# Patient Record
Sex: FEMALE | Race: WHITE | Employment: UNEMPLOYED | URBAN - METROPOLITAN AREA
[De-identification: names, ages, dates, MRNs, and addresses within clinical notes are randomized per-mention and may not be internally consistent; named-entity substitution may affect disease eponyms.]

---

## 2018-09-05 ENCOUNTER — OFFICE VISIT (OUTPATIENT)
Dept: OBGYN CLINIC | Age: 53
End: 2018-09-05

## 2018-09-05 VITALS
OXYGEN SATURATION: 100 % | SYSTOLIC BLOOD PRESSURE: 147 MMHG | DIASTOLIC BLOOD PRESSURE: 87 MMHG | BODY MASS INDEX: 34.66 KG/M2 | HEIGHT: 64 IN | HEART RATE: 75 BPM | RESPIRATION RATE: 18 BRPM | WEIGHT: 203 LBS

## 2018-09-05 DIAGNOSIS — Z01.419 ENCOUNTER FOR WELL WOMAN EXAM WITH ROUTINE GYNECOLOGICAL EXAM: Primary | ICD-10-CM

## 2018-09-05 RX ORDER — THERA TABS 400 MCG
1 TAB ORAL DAILY
COMMUNITY
End: 2018-09-17

## 2018-09-05 NOTE — PROGRESS NOTES
Subjective:   47 y.o. female for Well Woman Check. No LMP recorded. Social History: not sexually active. Pertinent past medical hstory: current smoker, history of TIA, no history of HTN, DVT, CAD, DM, liver disease, or migraines. Current Outpatient Prescriptions   Medication Sig Dispense Refill    therapeutic multivitamin (THERAGRAN) tablet Take 1 Tab by mouth daily.  oxybutynin chloride XL (DITROPAN XL) 10 mg CR tablet Take 1 Tab by mouth two (2) times a day. 60 Tab 5    pravastatin (PRAVACHOL) 20 mg tablet Take 20 mg by mouth nightly.  naproxen (NAPROSYN) 500 mg tablet Take 500 mg by mouth two (2) times daily (with meals).  FLUoxetine (PROZAC) 20 mg capsule Take  by mouth daily.  HYDROcodone-acetaminophen (NORCO) 5-325 mg per tablet Take 1 Tab by mouth every four (4) hours as needed for Pain.  30 Tab 0     Allergies   Allergen Reactions    Demerol [Meperidine] Hives, Nausea and Vomiting and Vertigo     Past Medical History:   Diagnosis Date    ADHD (attention deficit hyperactivity disorder)     Anxiety     Arthritis     Depression     Heart attack ([facility] [address] 2016    Hypercholesterolemia     Liver disease     Post traumatic stress disorder     Psychiatric diagnosis     Scoliosis     Seizure disorder ([facility] [address]     Sickle cell disease ([facility] [address]     Urinary tract infection      Past Surgical History:   Procedure Laterality Date    HX COLONOSCOPY  4/23/2013    HX TUBAL LIGATION  1993     Family History   Problem Relation Age of Onset    Diabetes Mother     Hypertension Mother     Lung Disease Mother     Asthma Mother     Breast Cancer Mother     Cancer Father      liver,prostate    Heart Disease Father     Diabetes Father     Parkinsonism Father     Breast Cancer Maternal Aunt     Breast Cancer Maternal Grandmother      Social History   Substance Use Topics    Smoking status: Current Some Day Smoker     Packs/day: 1.00     Years: 39.00    Smokeless tobacco: Never Used    Alcohol use No        ROS:  Feeling well. No dyspnea or chest pain on exertion. No abdominal pain, change in bowel habits, black or bloody stools. No urinary tract symptoms. GYN ROS: no breast pain or new or enlarging lumps on self exam, no vaginal bleeding, no discharge or pelvic pain. No neurological complaints. Objective:     Visit Vitals    /87    Pulse 75    Resp 18    Ht 5' 4\" (1.626 m)    Wt 203 lb (92.1 kg)    SpO2 100%    BMI 34.84 kg/m2     The patient appears well, alert, oriented x 3, in no distress. ENT normal.  Neck supple. No adenopathy or thyromegaly. LAYTON. Lungs are clear, good air entry, no wheezes, rhonchi or rales. S1 and S2 normal, no murmurs, regular rate and rhythm. Abdomen soft without tenderness, guarding, mass or organomegaly. Extremities show no edema, normal peripheral pulses. Neurological is normal, no focal findings.     BREAST EXAM: breasts appear normal, no suspicious masses, no skin or nipple changes or axillary nodes    PELVIC EXAM: normal external genitalia, vulva, vagina, cervix, uterus and adnexa, exam limited by obesity, PAP: Pap smear done today, HPV test    Assessment/Plan:   well woman  mammogram  pap smear  return annually or prn

## 2018-09-08 LAB
CYTOLOGIST CVX/VAG CYTO: NORMAL
CYTOLOGY CVX/VAG DOC THIN PREP: NORMAL
DX ICD CODE: NORMAL
HPV I/H RISK 4 DNA CVX QL PROBE+SIG AMP: NEGATIVE
Lab: NORMAL
OTHER STN SPEC: NORMAL
PATH REPORT.FINAL DX SPEC: NORMAL
STAT OF ADQ CVX/VAG CYTO-IMP: NORMAL

## 2018-09-17 PROBLEM — R35.0 URINARY FREQUENCY: Status: ACTIVE | Noted: 2018-09-17

## 2018-09-18 ENCOUNTER — HOSPITAL ENCOUNTER (OUTPATIENT)
Dept: MAMMOGRAPHY | Age: 53
Discharge: HOME | End: 2018-09-18
Attending: OBSTETRICS & GYNECOLOGY
Payer: MEDICAID

## 2018-09-18 DIAGNOSIS — Z01.419 ENCOUNTER FOR WELL WOMAN EXAM WITH ROUTINE GYNECOLOGICAL EXAM: ICD-10-CM

## 2018-09-18 PROCEDURE — 77067 SCR MAMMO BI INCL CAD: CPT

## 2018-10-01 ENCOUNTER — HOSPITAL ENCOUNTER (OUTPATIENT)
Dept: ULTRASOUND IMAGING | Age: 53
Discharge: HOME | End: 2018-10-01
Attending: OBSTETRICS & GYNECOLOGY
Payer: MEDICAID

## 2018-10-01 ENCOUNTER — HOSPITAL ENCOUNTER (OUTPATIENT)
Dept: MAMMOGRAPHY | Age: 53
Discharge: HOME | End: 2018-10-01
Attending: OBSTETRICS & GYNECOLOGY
Payer: MEDICAID

## 2018-10-01 DIAGNOSIS — R92.8 ABNORMALITY OF BOTH BREASTS ON SCREENING MAMMOGRAM: ICD-10-CM

## 2018-10-01 PROCEDURE — 77062 BREAST TOMOSYNTHESIS BI: CPT

## 2020-02-03 ENCOUNTER — HOSPITAL ENCOUNTER (OUTPATIENT)
Dept: CT IMAGING | Age: 55
Discharge: HOME | End: 2020-02-03
Attending: INTERNAL MEDICINE
Payer: MEDICAID

## 2020-02-03 DIAGNOSIS — R91.1 SOLITARY PULMONARY NODULE: ICD-10-CM

## 2020-02-03 PROCEDURE — 71250 CT THORAX DX C-: CPT

## 2020-03-26 PROBLEM — R07.89 CHEST PRESSURE: Status: ACTIVE | Noted: 2019-09-03

## 2020-06-05 ENCOUNTER — HOSPITAL ENCOUNTER (OUTPATIENT)
Dept: MAMMOGRAPHY | Age: 55
Discharge: HOME | End: 2020-06-05
Attending: OBSTETRICS & GYNECOLOGY
Payer: MEDICAID

## 2020-06-05 DIAGNOSIS — Z12.31 VISIT FOR SCREENING MAMMOGRAM: ICD-10-CM

## 2020-06-05 PROCEDURE — 77063 BREAST TOMOSYNTHESIS BI: CPT

## 2020-07-10 ENCOUNTER — OFFICE VISIT (OUTPATIENT)
Dept: OBGYN CLINIC | Age: 55
End: 2020-07-10

## 2020-07-10 VITALS
WEIGHT: 187 LBS | HEART RATE: 108 BPM | HEIGHT: 64 IN | TEMPERATURE: 97.6 F | SYSTOLIC BLOOD PRESSURE: 112 MMHG | DIASTOLIC BLOOD PRESSURE: 89 MMHG | BODY MASS INDEX: 31.92 KG/M2

## 2020-07-10 DIAGNOSIS — N95.0 POSTMENOPAUSAL BLEEDING: ICD-10-CM

## 2020-07-10 DIAGNOSIS — Z01.419 WELL WOMAN EXAM WITH ROUTINE GYNECOLOGICAL EXAM: Primary | ICD-10-CM

## 2020-07-10 NOTE — PROGRESS NOTES
Subjective:   53 y.o. female for Well Woman Check. She reports intermittent bleeding x 1 year. She denies having told a physician prior to today. Though she reports no current bleeding, she does report passing blood clots when she did have an episode of bleeding. Additionally, she reports multiple family members having breast cancer and ovarian cancer, but denies having been told about BRCA testing. Lastly, she reports the need for continued management of urinary incontinence. No LMP recorded. (Menstrual status: Menopause). Patient Active Problem List   Diagnosis Code    Urinary frequency R35.0    Chest pressure R07.89    Epileptic seizures ([facility] [address] G40.909    Pyelonephritis N12     Patient Active Problem List    Diagnosis Date Noted    Chest pressure 09/03/2019    Urinary frequency 09/17/2018    Epileptic seizures ([facility] [address] 07/06/2015    Pyelonephritis 07/02/2011     Current Outpatient Medications   Medication Sig Dispense Refill    metFORMIN (GLUCOPHAGE) 500 mg tablet       atorvastatin (LIPITOR) 40 mg tablet       Aimovig Autoinjector 70 mg/mL injection       ProAir HFA 90 mcg/actuation inhaler INHALE 2 PUFFS BY MOUTH EVERY 6 HOURS AS NEEDED FOR 30 DAYS      propranoloL (INDERAL) 40 mg tablet       Lantus Solostar U-100 Insulin 100 unit/mL (3 mL) inpn       Lantus U-100 Insulin 100 unit/mL injection       HumaLOG U-100 Insulin 100 unit/mL injection       Gavilyte-C 240-22.72-6.72 -5.84 gram solution TAKE AS DIRECTED BY MOUTH      aspirin 81 mg chewable tablet       clopidogreL (PLAVIX) 75 mg tab       spironolactone (ALDACTONE) 25 mg tablet       pantoprazole (PROTONIX) 40 mg tablet       multivitamin (ONE A DAY) tablet Take 1 Tab by mouth daily.  CAT'S CLAW, UNCARIA TOMENTOSA, PO Take  by mouth.  oxybutynin chloride XL (DITROPAN XL) 10 mg CR tablet Take 1 Tab by mouth two (2) times a day.  180 Tab 3     Allergies   Allergen Reactions    Demerol [Meperidine] Hives, Nausea and Vomiting and Vertigo    Ketorolac Nausea and Vomiting and Hives    Tramadol Not Reported This Time     Past Medical History:   Diagnosis Date    ADHD (attention deficit hyperactivity disorder)     Anxiety     Arthritis     Depression     Heart attack ([facility] [address] 2016    Hypercholesterolemia     Kidney stones     Liver disease     Post traumatic stress disorder     Psychiatric diagnosis     Scoliosis     Seizure disorder ([facility] [address]     Sickle cell disease ([facility]     Urinary tract infection      Past Surgical History:   Procedure Laterality Date    HX COLONOSCOPY  4/23/2013    HX TRACHEOSTOMY      HX TUBAL LIGATION  1993     Family History   Problem Relation Age of Onset    Diabetes Mother     Hypertension Mother     Lung Disease Mother     Asthma Mother     Breast Cancer Mother     Cancer Mother     Cancer Father         liver,prostate    Heart Disease Father     Diabetes Father     Parkinsonism Father     Breast Cancer Maternal Aunt     Breast Cancer Maternal Grandmother      Social History     Tobacco Use    Smoking status: Current Every Day Smoker     Packs/day: 0.25     Years: 39.00     Pack years: 9.75    Smokeless tobacco: Never Used   Substance Use Topics    Alcohol use: No        ROS:  Feeling well. No dyspnea or chest pain on exertion. No abdominal pain, change in bowel habits, black or bloody stools. No urinary tract symptoms. GYN ROS: no breast pain or new or enlarging lumps on self exam, she complains of postmenopausal bleeding. No neurological complaints. Objective:     Visit Vitals  /89   Pulse (!) 108   Temp 97.6 °F (36.4 °C)   Ht 5' 4\" (1.626 m)   Wt 187 lb (84.8 kg)   BMI 32.10 kg/m²     The patient appears well, alert, oriented x 3, in no distress. ENT normal.  Neck supple. No adenopathy or thyromegaly. L[place]TON. Lungs are clear, good air entry, no wheezes, rhonchi or rales. S1 and S2 normal, no murmurs, regular rate and rhythm.  Abdomen soft without tenderness, guarding, mass or organomegaly. Extremities show no edema, normal peripheral pulses. Neurological is normal, no focal findings. BREAST EXAM: breasts appear normal, no suspicious masses, no skin or nipple changes or axillary nodes    PELVIC EXAM: normal external genitalia, vulva, vagina, cervix, uterus and adnexa    Assessment/Plan:   well woman  pap smear  counseled on breast self exam  return annually or prn  Discussed my concerns regarding her postmenopausal bleeding and family history of breast and ovarian cancer. I advised that she and her family need BRCA testing and she needs an immediate work-up for postmenopausal bleeding. I discussed our office closure and advised her that I do not want to begin her work-up and have to end it abruptly due to office closure. We will help arrange an urgent referral to [facility] for both work-ups. Ms. Pauline Parson expressed understanding. ICD-10-CM ICD-9-CM    1. Well woman exam with routine gynecological exam  Z01.419 V72.31 PAP IG, CT-NG TV HPV 16&18,45 ([id number], [id number])   2. Postmenopausal bleeding  N95.0 627.1 REFERRAL TO GYNECOLOGY     Encounter Diagnoses   Name Primary?  Well woman exam with routine gynecological exam Yes    Postmenopausal bleeding      Orders Placed This Encounter    REFERRAL TO GYNECOLOGY    PAP IG, CT-NG TV HPV 16&18,45 ([id number], [id number])       .

## 2020-07-10 NOTE — PROGRESS NOTES
Chief Complaint   Patient presents with    Well Woman     Visit Vitals  /89   Pulse (!) 108   Temp 97.6 °F (36.4 °C)   Ht 5' 4\" (1.626 m)   Wt 187 lb (84.8 kg)   BMI 32.10 kg/m²     Results for orders placed or performed in visit on 09/17/18   AMB POC URINALYSIS DIP STICK AUTO W/O MICRO   Result Value Ref Range    Color (UA POC) Yellow     Clarity (UA POC) Clear     Glucose (UA POC) Negative Negative    Bilirubin (UA POC) Negative Negative    Ketones (UA POC) Negative Negative    Specific gravity (UA POC) 1.025 1.001 - 1.035    Blood (UA POC) Negative Negative    pH (UA POC) 6 4.6 - 8.0    Protein (UA POC) Negative Negative    Urobilinogen (UA POC) 0.2 mg/dL 0.2 - 1    Nitrites (UA POC) Negative Negative    Leukocyte esterase (UA POC) Negative Negative

## 2020-07-23 LAB
C TRACH RRNA CVX QL NAA+PROBE: NEGATIVE
CYTOLOGIST CVX/VAG CYTO: NORMAL
CYTOLOGY CVX/VAG DOC CYTO: NORMAL
CYTOLOGY CVX/VAG DOC THIN PREP: NORMAL
DX ICD CODE: NORMAL
HPV I/H RISK 1 DNA CVX QL PROBE+SIG AMP: NEGATIVE
Lab: NORMAL
Lab: NORMAL
N GONORRHOEA RRNA CVX QL NAA+PROBE: NEGATIVE
OTHER STN SPEC: NORMAL
STAT OF ADQ CVX/VAG CYTO-IMP: NORMAL
T VAGINALIS RRNA SPEC QL NAA+PROBE: NEGATIVE

## 2020-10-02 ENCOUNTER — HOSPITAL ENCOUNTER (OUTPATIENT)
Dept: CT IMAGING | Age: 55
Discharge: HOME | End: 2020-10-02
Attending: UROLOGY
Payer: MEDICAID

## 2020-10-02 DIAGNOSIS — N81.10 FEMALE CYSTOCELE: ICD-10-CM

## 2020-10-02 DIAGNOSIS — N32.81 OVERACTIVE BLADDER: ICD-10-CM

## 2020-10-02 DIAGNOSIS — N39.46 MIXED INCONTINENCE URGE AND STRESS (MALE)(FEMALE): ICD-10-CM

## 2020-10-02 LAB — CREAT UR-MCNC: 0.8 MG/DL (ref 0.6–1.3)

## 2020-10-02 PROCEDURE — 82565 ASSAY OF CREATININE: CPT

## 2020-10-02 PROCEDURE — 74011000636 HC RX REV CODE- 636: Performed by: UROLOGY

## 2020-10-02 PROCEDURE — 74178 CT ABD&PLV WO CNTR FLWD CNTR: CPT

## 2020-10-02 RX ADMIN — IOPAMIDOL 125 ML: 612 INJECTION, SOLUTION INTRAVENOUS at 07:32

## 2021-06-10 ENCOUNTER — HOSPITAL ENCOUNTER (OUTPATIENT)
Dept: WOMENS IMAGING | Age: 56
Discharge: HOME | End: 2021-06-10
Attending: NURSE PRACTITIONER
Payer: MEDICAID

## 2021-06-10 DIAGNOSIS — Z12.31 ENCOUNTER FOR SCREENING MAMMOGRAM FOR BREAST CANCER: ICD-10-CM

## 2021-06-10 PROCEDURE — 77063 BREAST TOMOSYNTHESIS BI: CPT

## 2022-03-19 PROBLEM — R07.89 CHEST PRESSURE: Status: ACTIVE | Noted: 2019-09-03

## 2022-03-19 PROBLEM — R35.0 URINARY FREQUENCY: Status: ACTIVE | Noted: 2018-09-17

## 2023-01-31 DIAGNOSIS — Z12.31 ENCOUNTER FOR SCREENING MAMMOGRAM FOR BREAST CANCER: Primary | ICD-10-CM

## 2023-02-03 DIAGNOSIS — Z12.31 ENCOUNTER FOR SCREENING MAMMOGRAM FOR BREAST CANCER: Primary | ICD-10-CM

## 2023-02-06 DIAGNOSIS — Z12.31 ENCOUNTER FOR SCREENING MAMMOGRAM FOR BREAST CANCER: Primary | ICD-10-CM
